# Patient Record
Sex: MALE | Race: WHITE | NOT HISPANIC OR LATINO | Employment: FULL TIME | URBAN - METROPOLITAN AREA
[De-identification: names, ages, dates, MRNs, and addresses within clinical notes are randomized per-mention and may not be internally consistent; named-entity substitution may affect disease eponyms.]

---

## 2021-11-30 ENCOUNTER — OFFICE VISIT (OUTPATIENT)
Dept: URGENT CARE | Facility: CLINIC | Age: 26
End: 2021-11-30
Payer: COMMERCIAL

## 2021-11-30 VITALS
DIASTOLIC BLOOD PRESSURE: 68 MMHG | TEMPERATURE: 97.4 F | BODY MASS INDEX: 26.52 KG/M2 | HEIGHT: 66 IN | RESPIRATION RATE: 16 BRPM | HEART RATE: 85 BPM | WEIGHT: 165 LBS | OXYGEN SATURATION: 96 % | SYSTOLIC BLOOD PRESSURE: 112 MMHG

## 2021-11-30 DIAGNOSIS — L30.9 DERMATITIS: ICD-10-CM

## 2021-11-30 PROCEDURE — 99203 OFFICE O/P NEW LOW 30 MIN: CPT | Performed by: STUDENT IN AN ORGANIZED HEALTH CARE EDUCATION/TRAINING PROGRAM

## 2021-11-30 RX ORDER — PREDNISONE 20 MG/1
TABLET ORAL
Qty: 14 TABLET | Refills: 0 | Status: SHIPPED | OUTPATIENT
Start: 2021-11-30

## 2021-11-30 NOTE — PROGRESS NOTES
Subjective:   CHIEF COMPLAINT  Chief Complaint   Patient presents with   • Rash     both arms, no known cause       HPI  Jason Lambert is a 26 y.o. male who presents with a chief complaint of an itchy rash that developed approximately 4 to 5 days ago.  Says it initially developed on his arm and then moved to his right axilla, lower torso proximal right lower leg.  No recent antibiotics.  No new medications.  He has not tried any medications.  Said he was recently hunting does not recall getting any direct contact doing that could cause a reaction.  No abdominal pain.  No nausea vomiting.    REVIEW OF SYSTEMS  General: no fever or chills  GI: no nausea or vomiting  See HPI for further details.    PAST MEDICAL HISTORY  Patient Active Problem List    Diagnosis Date Noted   • Pancreatic pseudocyst 12/31/2014   • Acute pancreatitis 12/29/2014   • Alcohol use 12/29/2014   • Leukocytosis 12/29/2014   • Thrombocytosis 12/29/2014   • Acute alcoholic pancreatitis 12/28/2014       SURGICAL HISTORY   has a past surgical history that includes gastroscopy-endo (12/31/2014).    ALLERGIES  Allergies   Allergen Reactions   • Pcn [Penicillins] Rash              CURRENT MEDICATIONS  Home Medications     Reviewed by Mahin Dolan Ass't (Medical Assistant) on 11/30/21 at 1350  Med List Status: <None>   Medication Last Dose Status   multivitamin (THERAGRAN) TABS Not Taking Active   ondansetron (ZOFRAN ODT) 4 MG TBDP Not Taking Active   ondansetron (ZOFRAN) 4 MG TABS Not Taking Active   oxycodone immediate release (ROXICODONE) 5 MG TABS Not Taking Active   oxycodone immediate release (ROXICODONE) 5 MG TABS Not Taking Active   promethazine (PHENERGAN) 25 MG TABS Not Taking Active   promethazine (PHENERGAN) 25 MG TABS Not Taking Active                SOCIAL HISTORY  Social History     Tobacco Use   • Smoking status: Never Smoker   • Smokeless tobacco: Never Used   Substance and Sexual Activity   • Alcohol use: Yes   • Drug use: No   •  "Sexual activity: Not on file       FAMILY HISTORY  No family history on file.       Objective:   PHYSICAL EXAM  VITAL SIGNS: /68   Pulse 85   Temp 36.3 °C (97.4 °F) (Temporal)   Resp 16   Ht 1.676 m (5' 6\")   Wt 74.8 kg (165 lb)   SpO2 96%   BMI 26.63 kg/m²     Gen: no acute distress, normal voice  Skin: Pinpoint erythematous macules and papules on the right upper extremity right axilla and right proximal thigh.  No excoriations.  No evidence of superimposed soft tissue infection  Lungs: CTAB w/ symmetric expansion  CV: RRR w/o murmurs or clicks  Psych: normal affect, normal judgement, alert, awake    Assessment/Plan:     1. Dermatitis  predniSONE (DELTASONE) 20 MG Tab   ?  Etiology.  No specific trigger.  We will do a trial of steroids which should cause resolution.  Encouraged to avoid scented soaps, laundry detergents or dryer sheets.  If symptoms do not improve or worsen return urgent care for reevaluation.    Differential diagnosis, natural history, supportive care, and indications for immediate follow-up discussed. All questions answered. Patient agrees with the plan of care.    Follow-up as needed if symptoms worsen or fail to improve to PCP, Urgent care or Emergency Room.    Please note that this dictation was created using voice recognition software. I have made a reasonable attempt to correct obvious errors, but I expect that there are errors of grammar and possibly content that I did not discover before finalizing the note.         "

## 2023-01-08 ENCOUNTER — TELEPHONE (OUTPATIENT)
Dept: SCHEDULING | Facility: IMAGING CENTER | Age: 28
End: 2023-01-08
Payer: COMMERCIAL

## 2023-01-10 ENCOUNTER — OFFICE VISIT (OUTPATIENT)
Dept: MEDICAL GROUP | Facility: PHYSICIAN GROUP | Age: 28
End: 2023-01-10
Payer: COMMERCIAL

## 2023-01-10 VITALS
TEMPERATURE: 98.4 F | SYSTOLIC BLOOD PRESSURE: 114 MMHG | RESPIRATION RATE: 14 BRPM | HEART RATE: 100 BPM | OXYGEN SATURATION: 96 % | WEIGHT: 145 LBS | BODY MASS INDEX: 23.3 KG/M2 | HEIGHT: 66 IN | DIASTOLIC BLOOD PRESSURE: 68 MMHG

## 2023-01-10 DIAGNOSIS — Z00.00 PHYSICAL EXAM, ANNUAL: ICD-10-CM

## 2023-01-10 DIAGNOSIS — N50.812 PAIN IN LEFT TESTICLE: ICD-10-CM

## 2023-01-10 PROCEDURE — 99385 PREV VISIT NEW AGE 18-39: CPT | Performed by: PHYSICIAN ASSISTANT

## 2023-01-10 ASSESSMENT — PATIENT HEALTH QUESTIONNAIRE - PHQ9: CLINICAL INTERPRETATION OF PHQ2 SCORE: 0

## 2023-01-11 NOTE — PROGRESS NOTES
CC:    Chief Complaint   Patient presents with    [facility]       HISTORY OF THE PRESENT ILLNESS: Patient is a 27 y.o. male presenting to [facility] primary care     About 2 months ago a dog stepped on his scrotum and he was suspecting he may have had R testicular torsion. He went to the ER but was not seen because he did not have insurance. R testicle feels fine now but the L one now is uncomfortable.    Pt has hx of pancreatitis. Thinks it may have been EtOH induced.     No problem-specific Assessment & Plan notes found for this encounter.    Allergies: Pcn [penicillins]    Current Outpatient Medications Ordered in Epic   Medication Sig Dispense Refill    predniSONE (DELTASONE) 20 MG Tab Take 2 tabs x5 days, 1 tab x3 days, one half tab x2 days. (Patient not taking: Reported on 1/10/2023) 14 Tablet 0    oxycodone immediate release (ROXICODONE) 5 MG TABS Take 1-2 Tabs by mouth every four hours as needed ((4-6)). (Patient not taking: Reported on 11/30/2021) 30 Tab 0    promethazine (PHENERGAN) 25 MG TABS Take 1 Tab by mouth every four hours as needed for Nausea/Vomiting (if ondansetron ineffective). (Patient not taking: Reported on 11/30/2021) 30 Tab 0    ondansetron (ZOFRAN) 4 MG TABS Take 1 Tab by mouth every four hours as needed for Nausea/Vomiting. (Patient not taking: Reported on 11/30/2021) 30 Tab 0    oxycodone immediate release (ROXICODONE) 5 MG TABS Take 5 mg by mouth every four hours as needed. (Patient not taking: Reported on 11/30/2021)      ondansetron (ZOFRAN ODT) 4 MG TBDP Take 4 mg by mouth every four hours as needed. (Patient not taking: Reported on 11/30/2021)      promethazine (PHENERGAN) 25 MG TABS Take 25 mg by mouth every four hours as needed. (Patient not taking: Reported on 11/30/2021)      multivitamin (THERAGRAN) TABS Take 1 Tab by mouth every day. (Patient not taking: Reported on 11/30/2021)       No current [facility]-ordered facility-administered medications on file.       Past Medical  "History:   Diagnosis Date    Childhood asthma        Past Surgical History:   Procedure Laterality Date    GASTROSCOPY-ENDO  12/31/2014    Performed by Hernan Garcia D.O. at [facility]       Social History     Tobacco Use    Smoking status: Never    Smokeless tobacco: Never   Substance Use Topics    Alcohol use: Yes    Drug use: No       Social History     Social History Narrative    Not on file       No family history on file.    ROS:     - Constitutional: Negative for fever, chills, unexpected weight change, and fatigue/generalized weakness.     - HEENT: Negative for headaches, vision changes, hearing changes, ear pain, ear discharge, rhinorrhea, sinus congestion, sore throat, and neck pain.      - Respiratory: Negative for cough, sputum production, chest congestion, dyspnea, wheezing, and crackles.      - Cardiovascular: Negative for chest pain, palpitations, orthopnea, and bilateral lower extremity edema.     - Gastrointestinal: Negative for heartburn, nausea, vomiting, abdominal pain, hematochezia, melena, diarrhea, constipation, and greasy/foul-smelling stools.     - Genitourinary: Negative for dysuria, polyuria, hematuria, pyuria, urinary urgency, and urinary incontinence.     - Musculoskeletal: Negative for myalgias, back pain, and joint pain.     - Skin: Negative for rash, itching, cyanotic skin color change.     - Neurological: Negative for dizziness, tingling, tremors, focal sensory deficit, focal weakness and headaches.     - Endo/Heme/Allergies: Does not bruise/bleed easily.     - Psychiatric/Behavioral: Negative for depression, suicidal/homicidal ideation and memory loss.            .      Exam: /68   Pulse 100   Temp 36.9 °C (98.4 °F) (Temporal)   Resp 14   Ht 1.676 m (5' 6\")   Wt 65.8 kg (145 lb)   SpO2 96%  Body mass index is 23.4 kg/m².    General: Normal appearing. No acute distress.  Skin: Warm and dry.  No obvious lesions.  HEENT: Normocephalic. Eyes conjunctiva " clear lids without ptosis, ears normal shape and contour  Cardiovascular: Regular rate and rhythm without murmur.   Respiratory: Clear to auscultation bilaterally, no rhonchi wheezing or rales.  : Positive for 2cm swelling over superior pole of L testicle.  Neurologic: Grossly nonfocal, A&O x3, gait normal,  Musculoskeletal: No deformity or swelling.   Extremities: No extremity cyanosis, clubbing, or edema.  Psych: Normal mood and affect. Judgment and insight is normal.    Please note that this dictation was created using voice recognition software. I have made every reasonable attempt to correct obvious errors, but I expect that there are errors of grammar and possibly content that I did not discover before finalizing the note.      Assessment/Plan  1. Physical exam, annual  PE conducted.     2. Pain in left testicle  Will get US and review at next appt when results available. Suspecting spermatocele.  - [id number]; Future  - URINALYSIS,CULTURE IF INDICATED; Future

## 2023-01-12 ENCOUNTER — HOSPITAL ENCOUNTER (OUTPATIENT)
Dept: LAB | Facility: MEDICAL CENTER | Age: 28
End: 2023-01-12
Attending: PHYSICIAN ASSISTANT
Payer: COMMERCIAL

## 2023-01-12 DIAGNOSIS — N50.812 PAIN IN LEFT TESTICLE: ICD-10-CM

## 2023-01-12 LAB
APPEARANCE UR: CLEAR
BILIRUB UR QL STRIP.AUTO: NEGATIVE
COLOR UR: YELLOW
GLUCOSE UR STRIP.AUTO-MCNC: NEGATIVE MG/DL
KETONES UR STRIP.AUTO-MCNC: NEGATIVE MG/DL
LEUKOCYTE ESTERASE UR QL STRIP.AUTO: NEGATIVE
MICRO URNS: NORMAL
NITRITE UR QL STRIP.AUTO: NEGATIVE
PH UR STRIP.AUTO: 6 [PH] (ref 5–8)
PROT UR QL STRIP: NEGATIVE MG/DL
RBC UR QL AUTO: NEGATIVE
SP GR UR STRIP.AUTO: 1.02
UROBILINOGEN UR STRIP.AUTO-MCNC: 0.2 MG/DL

## 2023-01-12 PROCEDURE — 81003 URINALYSIS AUTO W/O SCOPE: CPT

## 2023-01-13 ENCOUNTER — HOSPITAL ENCOUNTER (OUTPATIENT)
Dept: RADIOLOGY | Facility: MEDICAL CENTER | Age: 28
End: 2023-01-13
Attending: PHYSICIAN ASSISTANT
Payer: COMMERCIAL

## 2023-01-13 DIAGNOSIS — N50.812 PAIN IN LEFT TESTICLE: ICD-10-CM

## 2023-01-13 PROCEDURE — 76870 US EXAM SCROTUM: CPT
